# Patient Record
Sex: MALE | URBAN - METROPOLITAN AREA
[De-identification: names, ages, dates, MRNs, and addresses within clinical notes are randomized per-mention and may not be internally consistent; named-entity substitution may affect disease eponyms.]

---

## 2022-03-14 ENCOUNTER — OFFICE VISIT (OUTPATIENT)
Dept: URBAN - METROPOLITAN AREA CLINIC 47 | Facility: CLINIC | Age: 72
End: 2022-03-14
Payer: MEDICARE

## 2022-03-14 DIAGNOSIS — H33.311 HORSESHOE TEAR OF RETINA WITHOUT DETACHMENT, RIGHT EYE: Primary | ICD-10-CM

## 2022-03-14 DIAGNOSIS — H43.812 VITREOUS DEGENERATION, LEFT EYE: ICD-10-CM

## 2022-03-14 DIAGNOSIS — E11.3393 TYPE 2 DIABETES MELLITUS WITH MODERATE NONPROLIFERATIVE DIABETIC RETINOPATHY WITHOUT MACULAR EDEMA OF BILATERAL EYES: ICD-10-CM

## 2022-03-14 PROCEDURE — 92134 CPTRZ OPH DX IMG PST SGM RTA: CPT | Performed by: OPHTHALMOLOGY

## 2022-03-14 PROCEDURE — 92201 OPSCPY EXTND RTA DRAW UNI/BI: CPT | Performed by: OPHTHALMOLOGY

## 2022-03-14 PROCEDURE — 99204 OFFICE O/P NEW MOD 45 MIN: CPT | Performed by: OPHTHALMOLOGY

## 2022-03-14 RX ORDER — LOSARTAN POTASSIUM AND HYDROCHLOROTHIAZIDE 100; 12.5 MG/1; MG/1
TABLET, FILM COATED ORAL
Refills: 0 | Status: ACTIVE
Start: 2022-03-14

## 2022-03-14 RX ORDER — AMLODIPINE BESYLATE 5 MG/1
5 MG TABLET ORAL
Qty: 0 | Refills: 0 | Status: ACTIVE
Start: 2022-03-14

## 2022-03-14 RX ORDER — METFORMIN HCL 1000 MG/1
TABLET, FILM COATED ORAL
Refills: 0 | Status: ACTIVE
Start: 2022-03-14

## 2022-03-14 RX ORDER — INSULIN GLARGINE 100 [IU]/ML
INJECTION, SOLUTION SUBCUTANEOUS
Refills: 0 | Status: ACTIVE
Start: 2022-03-14

## 2022-03-14 ASSESSMENT — INTRAOCULAR PRESSURE
OD: 14
OS: 17

## 2022-03-14 NOTE — IMPRESSION/PLAN
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 Plan: OCT shows no CME OU. I emphasized the importance of blood sugar and blood pressure control. The patient understands that controlling BS and BP is the best way to stabilize vision at this time. We also discussed the importance of routine dilated eye exams.

## 2022-03-14 NOTE — IMPRESSION/PLAN
Impression: Horseshoe tear of retina without detachment, right eye: H33.311. Plan: He has good laser around his tear and no new tears/RD. He is possibly s/p PPV OD as well. observe with RD precautions. He will f/u with your excellent care for CE/IOL OS eval. 
thanks Darien Bledsoe RTC PRN

## 2022-04-14 ENCOUNTER — OFFICE VISIT (OUTPATIENT)
Dept: URBAN - METROPOLITAN AREA CLINIC 46 | Facility: CLINIC | Age: 72
End: 2022-04-14
Payer: MEDICARE

## 2022-04-14 DIAGNOSIS — E11.3292 TYPE 2 DIABETES MELLITUS WITH MILD NONPROLIFERATIVE DIABETIC RETINOPATHY WITHOUT MACULAR EDEMA, LEFT EYE: ICD-10-CM

## 2022-04-14 DIAGNOSIS — H16.223 KERATOCONJUNCTIVITIS SICCA, BILATERAL: ICD-10-CM

## 2022-04-14 DIAGNOSIS — H26.491 OTHER SECONDARY CATARACT, RIGHT EYE: ICD-10-CM

## 2022-04-14 DIAGNOSIS — H40.013 OPEN ANGLE WITH BORDERLINE FINDINGS, LOW RISK, BILATERAL: Primary | ICD-10-CM

## 2022-04-14 DIAGNOSIS — H35.373 PUCKERING OF MACULA, BILATERAL: ICD-10-CM

## 2022-04-14 DIAGNOSIS — H02.834 DERMATOCHALASIS OF LEFT UPPER LID: ICD-10-CM

## 2022-04-14 DIAGNOSIS — H02.831 DERMATOCHALASIS OF RIGHT UPPER LID: ICD-10-CM

## 2022-04-14 DIAGNOSIS — H25.12 AGE-RELATED NUCLEAR CATARACT, LEFT EYE: ICD-10-CM

## 2022-04-14 PROCEDURE — 76514 ECHO EXAM OF EYE THICKNESS: CPT | Performed by: OPHTHALMOLOGY

## 2022-04-14 PROCEDURE — 92134 CPTRZ OPH DX IMG PST SGM RTA: CPT | Performed by: OPHTHALMOLOGY

## 2022-04-14 PROCEDURE — 99214 OFFICE O/P EST MOD 30 MIN: CPT | Performed by: OPHTHALMOLOGY

## 2022-04-14 PROCEDURE — 92020 GONIOSCOPY: CPT | Performed by: OPHTHALMOLOGY

## 2022-04-14 NOTE — IMPRESSION/PLAN
Impression: Type 2 diabetes mellitus with mild nonproliferative diabetic retinopathy without macular edema, left eye: E11.3292. Plan: Patient advised there is diabetic retinopathy but no treatment is required at this time. Regular follow up is recommended.

## 2022-04-14 NOTE — IMPRESSION/PLAN
Impression: Keratoconjunctivitis sicca, bilateral: O55.773. Plan: Patient advised that dry eyes may be the cause of symptoms. Advised to use artificial tears as needed.

## 2022-05-02 ENCOUNTER — TESTING ONLY (OUTPATIENT)
Dept: URBAN - METROPOLITAN AREA CLINIC 46 | Facility: CLINIC | Age: 72
End: 2022-05-02
Payer: MEDICARE

## 2022-05-02 DIAGNOSIS — H02.831 DERMATOCHALASIS OF RIGHT UPPER EYELID: Primary | ICD-10-CM

## 2022-05-20 ENCOUNTER — OFFICE VISIT (OUTPATIENT)
Dept: URBAN - METROPOLITAN AREA CLINIC 46 | Facility: CLINIC | Age: 72
End: 2022-05-20
Payer: MEDICARE

## 2022-05-20 DIAGNOSIS — H40.013 OPEN ANGLE WITH BORDERLINE FINDINGS, LOW RISK, BILATERAL: ICD-10-CM

## 2022-05-20 DIAGNOSIS — H02.834 DERMATOCHALASIS OF LEFT UPPER LID: ICD-10-CM

## 2022-05-20 DIAGNOSIS — H25.12 AGE-RELATED NUCLEAR CATARACT, LEFT EYE: ICD-10-CM

## 2022-05-20 DIAGNOSIS — H26.491 OTHER SECONDARY CATARACT, RIGHT EYE: ICD-10-CM

## 2022-05-20 PROCEDURE — 92133 CPTRZD OPH DX IMG PST SGM ON: CPT | Performed by: OPHTHALMOLOGY

## 2022-05-20 PROCEDURE — 99213 OFFICE O/P EST LOW 20 MIN: CPT | Performed by: OPHTHALMOLOGY

## 2022-05-20 PROCEDURE — 92083 EXTENDED VISUAL FIELD XM: CPT | Performed by: OPHTHALMOLOGY

## 2022-05-20 ASSESSMENT — INTRAOCULAR PRESSURE
OD: 17
OS: 16
OD: 15
OS: 17

## 2022-05-20 NOTE — IMPRESSION/PLAN
Impression: Dermatochalasis of right upper lid: H02.831. Plan: Visual field and clinical examination show significant impairment of visual field due to eyelid malposition. Patient may proceed with surgery. They wish to proceed.

## 2022-05-20 NOTE — IMPRESSION/PLAN
Impression: Dermatochalasis of left upper lid: H02.834. Plan: Visual field and clinical examination show significant impairment of visual field due to eyelid malposition. Patient may proceed with surgery. They wish to proceed.

## 2022-06-30 ENCOUNTER — PROCEDURE (OUTPATIENT)
Dept: URBAN - METROPOLITAN AREA SURGERY 25 | Facility: SURGERY | Age: 72
End: 2022-06-30
Payer: MEDICARE

## 2022-07-01 ENCOUNTER — POST-OPERATIVE VISIT (OUTPATIENT)
Dept: URBAN - METROPOLITAN AREA CLINIC 46 | Facility: CLINIC | Age: 72
End: 2022-07-01
Payer: MEDICARE

## 2022-07-01 DIAGNOSIS — Z48.89 ENCOUNTER FOR OTHER SPECIFIED SURGICAL AFTERCARE: Primary | ICD-10-CM

## 2022-07-01 PROCEDURE — 99024 POSTOP FOLLOW-UP VISIT: CPT | Performed by: OPHTHALMOLOGY

## 2022-07-01 NOTE — IMPRESSION/PLAN
Impression: S/P Both Upper Eyelid Blepharoplasty OU - 1 Day. Encounter for other specified surgical aftercare  Z48.89. Excellent post op course   Post operative instructions reviewed - Plan: Good post op appearance. Advised to use ice frequently, apply ointment twice a day, may clean surgical area gently, take oral antibiotics as prescribed, return in 2 weeks.

## 2022-07-14 ENCOUNTER — POST-OPERATIVE VISIT (OUTPATIENT)
Dept: URBAN - METROPOLITAN AREA CLINIC 46 | Facility: CLINIC | Age: 72
End: 2022-07-14
Payer: MEDICARE

## 2022-07-14 DIAGNOSIS — Z48.89 ENCOUNTER FOR OTHER SPECIFIED SURGICAL AFTERCARE: Primary | ICD-10-CM

## 2022-07-14 PROCEDURE — 99024 POSTOP FOLLOW-UP VISIT: CPT | Performed by: OPHTHALMOLOGY

## 2022-07-14 ASSESSMENT — INTRAOCULAR PRESSURE
OS: 16
OD: 16

## 2022-07-14 NOTE — IMPRESSION/PLAN
Impression: S/P BULB  - 14 Days. Encounter for other specified surgical aftercare  Z48.89. Plan: Patient doing well post surgery, lid(s) are healing well. Remaining sutures removed from eyelid(s). Continue Maxitrol ointment until completely gone.

## 2023-08-30 ENCOUNTER — OFFICE VISIT (OUTPATIENT)
Dept: URBAN - METROPOLITAN AREA CLINIC 46 | Facility: CLINIC | Age: 73
End: 2023-08-30
Payer: MEDICARE

## 2023-08-30 DIAGNOSIS — H40.013 OPEN ANGLE WITH BORDERLINE FINDINGS, LOW RISK, BILATERAL: ICD-10-CM

## 2023-08-30 DIAGNOSIS — H35.373 PUCKERING OF MACULA, BILATERAL: ICD-10-CM

## 2023-08-30 DIAGNOSIS — H16.223 KERATOCONJUNCTIVITIS SICCA, BILATERAL: ICD-10-CM

## 2023-08-30 DIAGNOSIS — H26.491 OTHER SECONDARY CATARACT, RIGHT EYE: ICD-10-CM

## 2023-08-30 DIAGNOSIS — H25.12 AGE-RELATED NUCLEAR CATARACT, LEFT EYE: Primary | ICD-10-CM

## 2023-08-30 PROCEDURE — 92134 CPTRZ OPH DX IMG PST SGM RTA: CPT | Performed by: OPHTHALMOLOGY

## 2023-08-30 PROCEDURE — 99214 OFFICE O/P EST MOD 30 MIN: CPT | Performed by: OPHTHALMOLOGY

## 2023-08-30 ASSESSMENT — INTRAOCULAR PRESSURE
OS: 17
OD: 15

## 2023-09-06 ENCOUNTER — OFFICE VISIT (OUTPATIENT)
Dept: URBAN - METROPOLITAN AREA CLINIC 46 | Facility: CLINIC | Age: 73
End: 2023-09-06
Payer: MEDICARE

## 2023-09-06 DIAGNOSIS — E11.3293 TYPE 2 DIAB W MILD NONPRLF DIABETIC RTNOP W/O MACULAR EDEMA, BILATERAL: ICD-10-CM

## 2023-09-06 DIAGNOSIS — H25.12 AGE-RELATED NUCLEAR CATARACT, LEFT EYE: ICD-10-CM

## 2023-09-06 DIAGNOSIS — H35.372 PUCKERING OF MACULA, LEFT EYE: Primary | ICD-10-CM

## 2023-09-06 PROCEDURE — 99213 OFFICE O/P EST LOW 20 MIN: CPT | Performed by: SPECIALIST

## 2023-09-06 PROCEDURE — 92134 CPTRZ OPH DX IMG PST SGM RTA: CPT | Performed by: SPECIALIST

## 2023-09-06 ASSESSMENT — INTRAOCULAR PRESSURE
OS: 18
OD: 18

## 2024-03-28 ENCOUNTER — OFFICE VISIT (OUTPATIENT)
Dept: URBAN - METROPOLITAN AREA CLINIC 46 | Facility: CLINIC | Age: 74
End: 2024-03-28
Payer: MEDICARE

## 2024-03-28 DIAGNOSIS — E11.3291 TYPE 2 DIABETES MELLITUS WITH MILD NONPROLIFERATIVE DIABETIC RETINOPATHY WITHOUT MACULAR EDEMA, RIGHT EYE: ICD-10-CM

## 2024-03-28 DIAGNOSIS — H35.372 PUCKERING OF MACULA, LEFT EYE: ICD-10-CM

## 2024-03-28 DIAGNOSIS — E11.3212 TYPE 2 DIAB W MILD NONPRLF DIABETIC RTNOP W MACULAR EDEMA, LEFT EYE: Primary | ICD-10-CM

## 2024-03-28 PROCEDURE — 92134 CPTRZ OPH DX IMG PST SGM RTA: CPT | Performed by: SPECIALIST

## 2024-03-28 PROCEDURE — 99213 OFFICE O/P EST LOW 20 MIN: CPT | Performed by: SPECIALIST

## 2024-03-28 PROCEDURE — 67028 INJECTION EYE DRUG: CPT | Performed by: SPECIALIST

## 2024-03-28 ASSESSMENT — INTRAOCULAR PRESSURE
OS: 16
OD: 16

## 2024-05-03 ENCOUNTER — OFFICE VISIT (OUTPATIENT)
Dept: URBAN - METROPOLITAN AREA CLINIC 46 | Facility: CLINIC | Age: 74
End: 2024-05-03
Payer: MEDICARE

## 2024-05-03 DIAGNOSIS — E11.3212 TYPE 2 DIABETES MELLITUS WITH MILD NONPROLIFERATIVE DIABETIC RETINOPATHY WITH MACULAR EDEMA, LEFT EYE: Primary | ICD-10-CM

## 2024-05-03 PROCEDURE — 67028 INJECTION EYE DRUG: CPT | Performed by: SPECIALIST

## 2024-05-03 PROCEDURE — 92134 CPTRZ OPH DX IMG PST SGM RTA: CPT | Performed by: SPECIALIST

## 2024-05-03 ASSESSMENT — INTRAOCULAR PRESSURE
OS: 14
OD: 14

## 2024-07-23 ENCOUNTER — OFFICE VISIT (OUTPATIENT)
Dept: URBAN - METROPOLITAN AREA CLINIC 47 | Facility: CLINIC | Age: 74
End: 2024-07-23
Payer: MEDICARE

## 2024-07-23 DIAGNOSIS — E11.3393 TYPE 2 DIABETES MELLITUS WITH MODERATE NONPROLIFERATIVE DIABETIC RETINOPATHY WITHOUT MACULAR EDEMA OF BILATERAL EYES: ICD-10-CM

## 2024-07-23 DIAGNOSIS — E11.3293 TYPE 2 DIABETES MELLITUS WITH MILD NONPROLIFERATIVE DIABETIC RETINOPATHY WITHOUT MACULAR EDEMA, BILATERAL: ICD-10-CM

## 2024-07-23 DIAGNOSIS — H33.311 HORSESHOE TEAR OF RETINA WITHOUT DETACHMENT, RIGHT EYE: ICD-10-CM

## 2024-07-23 DIAGNOSIS — H35.372 PUCKERING OF MACULA, LEFT EYE: Primary | ICD-10-CM

## 2024-07-23 PROCEDURE — 92014 COMPRE OPH EXAM EST PT 1/>: CPT | Performed by: OPHTHALMOLOGY

## 2024-07-23 PROCEDURE — 92134 CPTRZ OPH DX IMG PST SGM RTA: CPT | Performed by: OPHTHALMOLOGY

## 2024-07-23 ASSESSMENT — INTRAOCULAR PRESSURE
OD: 12
OS: 13

## 2024-10-25 ENCOUNTER — OFFICE VISIT (OUTPATIENT)
Dept: URBAN - METROPOLITAN AREA CLINIC 48 | Facility: CLINIC | Age: 74
End: 2024-10-25
Payer: MEDICARE

## 2024-10-25 DIAGNOSIS — E11.3293 TYPE 2 DIABETES MELLITUS WITH MILD NONPROLIFERATIVE DIABETIC RETINOPATHY WITHOUT MACULAR EDEMA, BILATERAL: Primary | ICD-10-CM

## 2024-10-25 DIAGNOSIS — H35.372 PUCKERING OF MACULA, LEFT EYE: ICD-10-CM

## 2024-10-25 DIAGNOSIS — H40.013 OPEN ANGLE WITH BORDERLINE FINDINGS, LOW RISK, BILATERAL: ICD-10-CM

## 2024-10-25 DIAGNOSIS — H25.812 COMBINED FORMS OF AGE-RELATED CATARACT, LEFT EYE: ICD-10-CM

## 2024-10-25 PROCEDURE — 92134 CPTRZ OPH DX IMG PST SGM RTA: CPT | Performed by: OPHTHALMOLOGY

## 2024-10-25 PROCEDURE — 99204 OFFICE O/P NEW MOD 45 MIN: CPT | Performed by: OPHTHALMOLOGY

## 2024-10-25 ASSESSMENT — KERATOMETRY
OS: 44.13
OD: 44.25

## 2024-10-25 ASSESSMENT — INTRAOCULAR PRESSURE
OS: 18
OD: 18

## 2024-10-25 ASSESSMENT — VISUAL ACUITY: OS: 20/30

## 2024-12-04 ENCOUNTER — TECH ONLY (OUTPATIENT)
Dept: URBAN - METROPOLITAN AREA CLINIC 48 | Facility: CLINIC | Age: 74
End: 2024-12-04
Payer: MEDICARE

## 2024-12-04 DIAGNOSIS — H25.812 COMBINED FORMS OF AGE-RELATED CATARACT, LEFT EYE: Primary | ICD-10-CM

## 2024-12-04 ASSESSMENT — PACHYMETRY
OD: 23.21
OS: 2.86
OS: 23.24
OD: 3.69

## 2024-12-04 ASSESSMENT — KERATOMETRY
OD: 44.76
OS: 44.09

## 2024-12-12 ENCOUNTER — Encounter (OUTPATIENT)
Dept: URBAN - METROPOLITAN AREA SURGERY 25 | Facility: SURGERY | Age: 74
End: 2024-12-12
Payer: MEDICARE

## 2024-12-12 ENCOUNTER — PROCEDURE (OUTPATIENT)
Dept: URBAN - METROPOLITAN AREA SURGERY 24 | Facility: SURGERY | Age: 74
End: 2024-12-12
Payer: MEDICARE

## 2024-12-12 DIAGNOSIS — H21.81 FLOPPY IRIS SYNDROME: Primary | ICD-10-CM

## 2024-12-12 PROCEDURE — 66984 XCAPSL CTRC RMVL W/O ECP: CPT | Performed by: STUDENT IN AN ORGANIZED HEALTH CARE EDUCATION/TRAINING PROGRAM

## 2024-12-13 ENCOUNTER — POST-OPERATIVE VISIT (OUTPATIENT)
Dept: URBAN - METROPOLITAN AREA CLINIC 48 | Facility: CLINIC | Age: 74
End: 2024-12-13
Payer: MEDICARE

## 2024-12-13 DIAGNOSIS — Z96.1 PRESENCE OF INTRAOCULAR LENS: Primary | ICD-10-CM

## 2024-12-13 PROCEDURE — 99024 POSTOP FOLLOW-UP VISIT: CPT | Performed by: STUDENT IN AN ORGANIZED HEALTH CARE EDUCATION/TRAINING PROGRAM

## 2024-12-13 RX ORDER — KETOROLAC TROMETHAMINE 5 MG/ML
0.5 % SOLUTION OPHTHALMIC
Qty: 5 | Refills: 1 | Status: ACTIVE
Start: 2024-12-13

## 2024-12-13 ASSESSMENT — INTRAOCULAR PRESSURE: OS: 21

## 2024-12-19 ENCOUNTER — POST-OPERATIVE VISIT (OUTPATIENT)
Dept: URBAN - METROPOLITAN AREA CLINIC 48 | Facility: CLINIC | Age: 74
End: 2024-12-19
Payer: MEDICARE

## 2024-12-19 DIAGNOSIS — Z96.1 PRESENCE OF INTRAOCULAR LENS: Primary | ICD-10-CM

## 2024-12-19 PROCEDURE — 99024 POSTOP FOLLOW-UP VISIT: CPT | Performed by: STUDENT IN AN ORGANIZED HEALTH CARE EDUCATION/TRAINING PROGRAM

## 2024-12-19 ASSESSMENT — INTRAOCULAR PRESSURE
OS: 14
OD: 16

## 2025-01-06 ENCOUNTER — POST-OPERATIVE VISIT (OUTPATIENT)
Dept: URBAN - METROPOLITAN AREA CLINIC 48 | Facility: CLINIC | Age: 75
End: 2025-01-06
Payer: MEDICARE

## 2025-01-06 DIAGNOSIS — Z96.1 PRESENCE OF INTRAOCULAR LENS: Primary | ICD-10-CM

## 2025-01-06 ASSESSMENT — INTRAOCULAR PRESSURE
OS: 14
OD: 14

## 2025-07-18 ENCOUNTER — OFFICE VISIT (OUTPATIENT)
Dept: URBAN - METROPOLITAN AREA CLINIC 48 | Facility: CLINIC | Age: 75
End: 2025-07-18
Payer: MEDICARE

## 2025-07-18 DIAGNOSIS — H40.013 OPEN ANGLE WITH BORDERLINE FINDINGS, LOW RISK, BILATERAL: ICD-10-CM

## 2025-07-18 DIAGNOSIS — E11.3293 TYPE 2 DIABETES MELLITUS WITH MILD NONPROLIFERATIVE DIABETIC RETINOPATHY WITHOUT MACULAR EDEMA, BILATERAL: Primary | ICD-10-CM

## 2025-07-18 PROCEDURE — 99214 OFFICE O/P EST MOD 30 MIN: CPT | Performed by: OPHTHALMOLOGY

## 2025-07-18 PROCEDURE — 92134 CPTRZ OPH DX IMG PST SGM RTA: CPT | Performed by: OPHTHALMOLOGY

## 2025-07-18 ASSESSMENT — INTRAOCULAR PRESSURE
OD: 14
OS: 14